# Patient Record
(demographics unavailable — no encounter records)

---

## 2024-11-07 NOTE — PHYSICAL EXAM
[Clear Rhinorrhea] : clear rhinorrhea [Wheezing] : wheezing [Transmitted Upper Airway Sounds] : transmitted upper airway sounds [NL] : warm, clear [FreeTextEntry4] : nasal congestion

## 2024-11-07 NOTE — DISCUSSION/SUMMARY
[FreeTextEntry1] : EMA 6 year F with asthma presenting with uri asthma.  URI and asthma: Recommend supportive care including antipyretics, fluids, OTC cough/cold medications if age-appropriate, and nasal saline followed by nasal suction. Take albuterol q4h for 48 hours, then wean prn. Continue on sick asthma regimen as per asthma action plan, as reviewed. Return to clinic or ED if symptoms worsen or persist.   Caretaker expressed understanding of the plan and agrees. No other concerns or questions today.

## 2024-11-07 NOTE — HISTORY OF PRESENT ILLNESS
[FreeTextEntry6] : few day hx of sore throat, fever cough, congestion, runny nose - wheezing at night meds - Acetaminophen prn  decreased po intake however hydrating well needs new nebulizer no vomiting, diarrhea, uri symptoms, sob or difficulty breathing, joint pain or swelling, rash, urinary symptoms, headaches, ear pain or tugging

## 2025-01-17 NOTE — HISTORY OF PRESENT ILLNESS
[FreeTextEntry6] : since yesterday cough, congestion, runny nose  39C yesterday - Acetaminophen  using albuterol nebs q12h  post tussive emesis  no vomiting, diarrhea, sob or difficulty breathing, joint pain or swelling, rash, urinary symptoms, headaches, ear pain or tugging

## 2025-01-17 NOTE — DISCUSSION/SUMMARY
[FreeTextEntry1] : URI: Recommend supportive care including antipyretics, fluids, OTC cough/cold medications if age-appropriate, and nasal saline followed by nasal suction. Asthma sick regimen reviewed, meds refilled. Prednisolone course as in mild exacerbation. Has appt Monday to f/u, with CPE. Alarming breathing patterns such as retractions discussed and educational video reviewed, as applicable. Patient to be brought to the ED if has persistent decreased oral intake, decrease in wet diapers, fever >100.4F or becomes patient becomes lethargic or changed in mental status and alertness. To note if fever > 5 days must be seen immediately either in clinic or in ED.   Caretaker expressed understanding of the plan and agrees. No other concerns or questions today.

## 2025-01-21 NOTE — HISTORY OF PRESENT ILLNESS
[FreeTextEntry6] : needs asthma form filled out, per school  also with RIGHT ear pain suddenly since this morning low fever, Ibuprofen prn  still with intermittent cough but uri is resolving improved appetite  No vomiting, diarrhea, sob or difficulty breathing, joint pain or swelling, rash, urinary symptoms, headaches  ASTHMA ASSESSMENT Asthma Severity Current: intermittent Medications: albuterol prn, budesonide q12h prn  Triggers: cold weather, exercise, seasonal allergies Patient follows with Pulmonologist: none Control: no hospitalizations or ED visits for at least 6 mo, no oral steroid use for at least 6 mo Childhood asthma control test score (if child is younger than 12 years): good Child has a School Medication Administration Form Filled: yes Asthma Severity Updated: intermittent

## 2025-01-21 NOTE — CARE PLAN
[Care Plan reviewed and provided to patient/caregiver] : Care plan reviewed and provided to patient/caregiver [Care Plan reviewed every ___ weeks] : Care plan reviewed every [unfilled] weeks [Understands and communicates without difficulty] : Patient/Caregiver understands and communicates without difficulty [FreeTextEntry2] : EMA : to take my medicine when I have a hard time breathing Mother: to be more aware of asthma signs and give albuterol at start of trigger    [FreeTextEntry3] : f/u in 6 mo to re-evaluate asthma status Counseled and educated regarding asthma care and action plan   ASTHMA ACTION PLAN Asthma Severity: intermittent Triggers: seasonal allergies, cold weather, exercise >Green Zone Controller - q12h Allergy meds - daily, especially during allergy season prn  >Yellow Zone Controller - q12h Albuterol neb 1-2 vials or Albuterol pump 2 puffs q4h If using albuterol q3h, then f/u with PMD to see if oral steroids are needed >Red Zone Controller - q12h Albuterol neb 1-2 vials or Albuterol pump 2 puffs q15min to q2h Oral steroids (rx by PMD)

## 2025-01-21 NOTE — DISCUSSION/SUMMARY
[FreeTextEntry1] : RIGHT Otitis Media: Start antibiotics daily until complete. Continue supportive care. Return precautions reviewed. Patient to be brought to the ED if has persistent decreased oral intake, decrease in wet diapers, fever >100.4F or becomes patient becomes lethargic or changed in mental status and alertness. To note if fever > 5 days must be seen immediately either in clinic or in ED.   Intermittent asthma - forms filled, copies given to parent. School form provided. Asthma action plan reviewed and provided. Meds reassessed and sent to pharmacy prn, RTC in 6mo for asthma reassessment, discussion with mother and EMA regarding medication administration instructions and timing, reviewed how to use albuterol pump and spacer/mask. If giving albuterol q2h and/or respiratory distress, must go to ED for evaluation.  Continue supportive care. ED precautions reviewed. RTC routine and prn. Caretaker expressed understanding of the plan and agrees. No other concerns or questions today.

## 2025-06-19 NOTE — PHYSICAL EXAM
[NL] : warm, clear [Clear Rhinorrhea] : clear rhinorrhea [Wheezing] : no wheezing [Transmitted Upper Airway Sounds] : transmitted upper airway sounds

## 2025-06-19 NOTE — DISCUSSION/SUMMARY
[FreeTextEntry1] : EMA 6 year F with asthma presenting with uri asthma.  URI and asthma: Recommend supportive care including antipyretics, fluids, OTC cough/cold medications if age-appropriate, and nasal saline followed by nasal suction. Take albuterol q4h for 48 hours, then wean prn. Return to clinic or ED if symptoms worsen or persist. Asthma action plan reviewed, meds refilled prn.  Asthma action plan and MAF updated. Caretaker expressed understanding of the plan and agrees. No other concerns or questions today.

## 2025-06-19 NOTE — HISTORY OF PRESENT ILLNESS
[FreeTextEntry6] : since sunday with fever and cough 100.4f tmax, Ibuprofen  with little diarrhea decreased po intake however trying to hydrate well using albuterol as per asthma action plan reviewed no vomiting, sob or difficulty breathing, joint pain or swelling, rash, urinary symptoms, headaches, ear pain or tugging

## 2025-07-08 NOTE — DISCUSSION/SUMMARY
[] : The components of the vaccine(s) to be administered today are listed in the plan of care. The disease(s) for which the vaccine(s) are intended to prevent and the risks have been discussed with the caretaker.  The risks are also included in the appropriate vaccination information statements which have been provided to the patient's caregiver.  The caregiver has given consent to vaccinate. [FreeTextEntry1] : EMA is a 6 year F presenting for [facility]. Wears glasses, has optho, forgot glasses today.  WCC - Growth and development reviewed - Anticipatory guidance and routine care provided - RTC for next WCC and prn - Screening: Vision, Color Test, Hearing - Labs: labwork holiday - Immunizations: Hep A #2 - Referrals: none  Child AG: Continue balanced diet with all food groups. Brush teeth twice a day with toothbrush. Recommend visit to dentist. Help child to maintain consistent daily routines and sleep schedule. School discussed. Ensure home is safe. Teach child about personal safety. Use consistent, positive discipline. Limit screen time to no more than 2 hours per day. Encourage physical activity. Child needs to ride in a belt-positioning booster seat until  4 feet 9 inches has been reached and are between 8 and 12 years of age.  Caretaker expressed understanding of the plan and agrees. No other concerns or questions today.

## 2025-07-08 NOTE — PHYSICAL EXAM
[Alert] : alert [No Acute Distress] : no acute distress [Normocephalic] : normocephalic [Conjunctivae with no discharge] : conjunctivae with no discharge [PERRL] : PERRL [EOMI Bilateral] : EOMI bilateral [Auricles Well Formed] : auricles well formed [Clear Tympanic membranes with present light reflex and bony landmarks] : clear tympanic membranes with present light reflex and bony landmarks [No Discharge] : no discharge [Nares Patent] : nares patent [Pink Nasal Mucosa] : pink nasal mucosa [Palate Intact] : palate intact [Nonerythematous Oropharynx] : nonerythematous oropharynx [Supple, full passive range of motion] : supple, full passive range of motion [No Palpable Masses] : no palpable masses [Symmetric Chest Rise] : symmetric chest rise [Clear to Auscultation Bilaterally] : clear to auscultation bilaterally [Regular Rate and Rhythm] : regular rate and rhythm [Normal S1, S2 present] : normal S1, S2 present [No Murmurs] : no murmurs [+2 Femoral Pulses] : +2 femoral pulses [Soft] : soft [NonTender] : non tender [Non Distended] : non distended [Normoactive Bowel Sounds] : normoactive bowel sounds [No Hepatomegaly] : no hepatomegaly [No Splenomegaly] : no splenomegaly [Rene: ____] : Rene [unfilled] [Rene: _____] : Rene [unfilled] [No Abnormal Lymph Nodes Palpated] : no abnormal lymph nodes palpated [No Gait Asymmetry] : no gait asymmetry [No pain or deformities with palpation of bone, muscles, joints] : no pain or deformities with palpation of bone, muscles, joints [Normal Muscle Tone] : normal muscle tone [Straight] : straight [+2 Patella DTR] : +2 patella DTR [Cranial Nerves Grossly Intact] : cranial nerves grossly intact [No Rash or Lesions] : no rash or lesions

## 2025-07-08 NOTE — HISTORY OF PRESENT ILLNESS
[Mother] : mother [Normal] : Normal [Brushing teeth] : Brushing teeth [Toothpaste] : Primary Fluoride Source: Toothpaste [Appropiate parent-child-sibling interaction] : Appropriate parent-child-sibling interaction [Parent has appropriate responses to behavior] : Parent has appropriate responses to behavior [Grade ___] : Grade [unfilled] [No] : Not at  exposure [Carbon Monoxide Detectors] : Carbon monoxide detectors [Smoke Detectors] : Smoke detectors [Up to date] : Up to date [FreeTextEntry7] : Doing well - no major hospitalizations or ED visits  - asthma - well controlled, intermittent, triggers (cold weather), albuterol prn [de-identified] : well balanced, no food allergies   [de-identified] : needs new dentist [de-identified] : services - none [NO] : No